# Patient Record
Sex: MALE
[De-identification: names, ages, dates, MRNs, and addresses within clinical notes are randomized per-mention and may not be internally consistent; named-entity substitution may affect disease eponyms.]

---

## 2023-06-01 ENCOUNTER — APPOINTMENT (OUTPATIENT)
Dept: ORTHOPEDIC SURGERY | Facility: CLINIC | Age: 43
End: 2023-06-01

## 2024-03-05 ENCOUNTER — APPOINTMENT (OUTPATIENT)
Dept: ORTHOPEDIC SURGERY | Facility: CLINIC | Age: 44
End: 2024-03-05
Payer: COMMERCIAL

## 2024-03-05 VITALS — HEIGHT: 68 IN | BODY MASS INDEX: 25.76 KG/M2 | WEIGHT: 170 LBS

## 2024-03-05 PROBLEM — Z00.00 ENCOUNTER FOR PREVENTIVE HEALTH EXAMINATION: Status: ACTIVE | Noted: 2024-03-05

## 2024-03-05 PROCEDURE — 99203 OFFICE O/P NEW LOW 30 MIN: CPT

## 2024-03-05 NOTE — DISCUSSION/SUMMARY
[de-identified] : Patient as well as a family member talked at length about how I believe that this probably should have been casted.  If the concern was that the patient was going to have some swelling in the posterior mold is appropriate however I believe she should have given and may have actually been given either crutches or cane.  Patient was placed in a cam walker will be given crutches to minimize weightbearing on the affected malleolus.  Patient be sent directly for radiographs which we will review today or tomorrow when they are available and notify the patient with our findings and our treatment options at that point.  If there is been displacement the patient may very well require ORIF if there is no displacement we will continue with CAM Walker with either limited or full weightbearing depending on the radiographic findings.  This consultation was 35 minutes.

## 2024-03-05 NOTE — REASON FOR VISIT
[Initial Visit] : an initial visit for [Other: ____] : [unfilled] [FreeTextEntry2] : LEFT ANKLE FRACTURE. HE TAKES MOTRIN FOR RELIEF. THIS HAPPENED ON 2/17/24. HE WENT TO THE EMERGENCY ROOM AT [facility] ON 2/21/24

## 2024-03-05 NOTE — PHYSICAL EXAM
[de-identified] : Patient has a wet posterior mold.  That was removed and discarded the skin was intact but had some chronic moisture issues but no skin breaks no infection.  He has point tenderness over the lateral malleolus. [de-identified] : Radiographs from the date of the emergency room visit was 2/21/2024.  They indicate a isolated lateral malleolus fracture which was minimally to mildly displaced.  No evidence of any syndesmotic separation or injury.  No medial sided injury to the malleolus.

## 2024-03-05 NOTE — HISTORY OF PRESENT ILLNESS
[de-identified] : First-time visit for this 43-year-old gentleman 2 and half weeks ago he injured his left ankle patient was taken to the emergency room at [facility] on 2/21/2024 the injury was 2/17/2024.  Patient was told that he had an ankle fracture his radiographs from the date of his visit to the emergency room to review this.

## 2024-03-12 ENCOUNTER — APPOINTMENT (OUTPATIENT)
Dept: ORTHOPEDIC SURGERY | Facility: CLINIC | Age: 44
End: 2024-03-12
Payer: COMMERCIAL

## 2024-03-12 DIAGNOSIS — S82.409A UNSPECIFIED FRACTURE OF SHAFT OF UNSPECIFIED FIBULA, INITIAL ENCOUNTER FOR CLOSED FRACTURE: ICD-10-CM

## 2024-03-12 DIAGNOSIS — S82.839A OTHER FRACTURE OF UPPER AND LOWER END OF UNSPECIFIED FIBULA, INITIAL ENCOUNTER FOR CLOSED FRACTURE: ICD-10-CM

## 2024-03-12 PROCEDURE — 99214 OFFICE O/P EST MOD 30 MIN: CPT

## 2024-03-13 PROBLEM — S82.839A FRACTURE OF DISTAL FIBULA: Status: ACTIVE | Noted: 2024-03-05

## 2024-03-13 PROBLEM — S82.409A FIBULA FRACTURE: Status: ACTIVE | Noted: 2024-03-05

## 2024-03-13 NOTE — HISTORY OF PRESENT ILLNESS
[de-identified] : 43 year old male presents with a left ankle fracture.  He was in an accident on 2/17/24 when he slipped on ice.  He went to ED and was closed reduced and splinting.  He was seen by Dr. Dixon and referred for surgical management.  He is in a CAM boot and has been using crutches and a cane.  He has recent xrays.

## 2024-03-13 NOTE — ASSESSMENT
[FreeTextEntry1] : 43 year old male presents with a left ankle fracture.  He was in an accident on 2/17/24 when he slipped on ice.  He went to ED and was closed reduced and splinting.  He was seen by Dr. Dixon and referred for surgical management.  He is in a CAM boot and has been using crutches and a cane.  He has recent xrays.  He has a left ankle fracture displaced. We discussed treatment options including surgery and its alternatives.  We discussed the risks and benefits of surgery. The risks include anesthesia, blood loss, need for blood transfusion, clots, stroke, myocardial infarct, chronic pain, loss of function, need for reoperation, nonunion, damage to neurovascular structure, infection, hardware failure, symptomatic hardware.  The patient understands the risks. He asked several great questions all of which were answered.  He understands the is at elevated risk due to his poorly controlled diabetes.  He consents to proceed with surgery.  He will need medical optimization and good perioperative glycemic control. We discussed the importance of NWB (patient had been WB in CAM boot and crutch). He understands.  We discussed red flag symptoms that would require emergent evaluation. He knows to call with any questions or concerns or if his symptoms acutely worsen.

## 2024-03-13 NOTE — PHYSICAL EXAM
[de-identified] : General: No acute distress, conversant, well-nourished. Head: Normocephalic, atraumatic Neck: trachea midline, FROM Heart: normotensive and normal rate and rhythm Lungs: No labored breathing Skin: No abrasions, no rashes, no edema Psych: Alert and oriented to person, place and time Extremities: no peripheral edema or digital cyanosis Vascular: warm and well perfused distally, palpable distal pulses  LLE:  skin intact swelling SILT m/l/1st DWS +motor flexes and extends toes.   WWP distally [de-identified] : I independently reviewed his left ankle radiographs which shows displaced ankle fracture

## 2024-03-20 ENCOUNTER — APPOINTMENT (OUTPATIENT)
Dept: ORTHOPEDIC SURGERY | Facility: HOSPITAL | Age: 44
End: 2024-03-20